# Patient Record
Sex: MALE | Race: WHITE | HISPANIC OR LATINO | Employment: FULL TIME | URBAN - METROPOLITAN AREA
[De-identification: names, ages, dates, MRNs, and addresses within clinical notes are randomized per-mention and may not be internally consistent; named-entity substitution may affect disease eponyms.]

---

## 2017-04-03 ENCOUNTER — ALLSCRIPTS OFFICE VISIT (OUTPATIENT)
Dept: OTHER | Facility: OTHER | Age: 22
End: 2017-04-03

## 2017-04-03 DIAGNOSIS — E55.9 VITAMIN D DEFICIENCY: ICD-10-CM

## 2017-04-03 DIAGNOSIS — K21.9 GASTRO-ESOPHAGEAL REFLUX DISEASE WITHOUT ESOPHAGITIS: ICD-10-CM

## 2017-04-03 DIAGNOSIS — Z00.00 ENCOUNTER FOR GENERAL ADULT MEDICAL EXAMINATION WITHOUT ABNORMAL FINDINGS: ICD-10-CM

## 2017-04-03 DIAGNOSIS — E78.5 HYPERLIPIDEMIA: ICD-10-CM

## 2017-04-04 ENCOUNTER — APPOINTMENT (OUTPATIENT)
Dept: LAB | Facility: CLINIC | Age: 22
End: 2017-04-04
Payer: COMMERCIAL

## 2017-04-04 ENCOUNTER — TRANSCRIBE ORDERS (OUTPATIENT)
Dept: LAB | Facility: CLINIC | Age: 22
End: 2017-04-04

## 2017-04-04 ENCOUNTER — GENERIC CONVERSION - ENCOUNTER (OUTPATIENT)
Dept: OTHER | Facility: OTHER | Age: 22
End: 2017-04-04

## 2017-04-04 DIAGNOSIS — Z00.00 ENCOUNTER FOR GENERAL ADULT MEDICAL EXAMINATION WITHOUT ABNORMAL FINDINGS: ICD-10-CM

## 2017-04-04 DIAGNOSIS — E78.5 HYPERLIPIDEMIA: ICD-10-CM

## 2017-04-04 DIAGNOSIS — K21.9 GASTRO-ESOPHAGEAL REFLUX DISEASE WITHOUT ESOPHAGITIS: ICD-10-CM

## 2017-04-04 DIAGNOSIS — E55.9 VITAMIN D DEFICIENCY: ICD-10-CM

## 2017-04-04 LAB
25(OH)D3 SERPL-MCNC: 10.5 NG/ML (ref 30–100)
ALBUMIN SERPL BCP-MCNC: 4 G/DL (ref 3.5–5)
ALP SERPL-CCNC: 78 U/L (ref 46–116)
ALT SERPL W P-5'-P-CCNC: 38 U/L (ref 12–78)
ANION GAP SERPL CALCULATED.3IONS-SCNC: 6 MMOL/L (ref 4–13)
AST SERPL W P-5'-P-CCNC: 25 U/L (ref 5–45)
BASOPHILS # BLD AUTO: 0.01 THOUSANDS/ΜL (ref 0–0.1)
BASOPHILS NFR BLD AUTO: 0 % (ref 0–1)
BILIRUB SERPL-MCNC: 1.09 MG/DL (ref 0.2–1)
BUN SERPL-MCNC: 16 MG/DL (ref 5–25)
CALCIUM SERPL-MCNC: 9.4 MG/DL (ref 8.3–10.1)
CHLORIDE SERPL-SCNC: 106 MMOL/L (ref 100–108)
CHOLEST SERPL-MCNC: 174 MG/DL (ref 50–200)
CO2 SERPL-SCNC: 28 MMOL/L (ref 21–32)
CREAT SERPL-MCNC: 0.84 MG/DL (ref 0.6–1.3)
EOSINOPHIL # BLD AUTO: 0.1 THOUSAND/ΜL (ref 0–0.61)
EOSINOPHIL NFR BLD AUTO: 2 % (ref 0–6)
ERYTHROCYTE [DISTWIDTH] IN BLOOD BY AUTOMATED COUNT: 11.9 % (ref 11.6–15.1)
GFR SERPL CREATININE-BSD FRML MDRD: >60 ML/MIN/1.73SQ M
GLUCOSE P FAST SERPL-MCNC: 90 MG/DL (ref 65–99)
HCT VFR BLD AUTO: 43.2 % (ref 36.5–49.3)
HDLC SERPL-MCNC: 33 MG/DL (ref 40–60)
HGB BLD-MCNC: 14.5 G/DL (ref 12–17)
LDLC SERPL CALC-MCNC: 121 MG/DL (ref 0–100)
LYMPHOCYTES # BLD AUTO: 2.14 THOUSANDS/ΜL (ref 0.6–4.47)
LYMPHOCYTES NFR BLD AUTO: 35 % (ref 14–44)
MCH RBC QN AUTO: 29.8 PG (ref 26.8–34.3)
MCHC RBC AUTO-ENTMCNC: 33.6 G/DL (ref 31.4–37.4)
MCV RBC AUTO: 89 FL (ref 82–98)
MONOCYTES # BLD AUTO: 0.46 THOUSAND/ΜL (ref 0.17–1.22)
MONOCYTES NFR BLD AUTO: 8 % (ref 4–12)
NEUTROPHILS # BLD AUTO: 3.44 THOUSANDS/ΜL (ref 1.85–7.62)
NEUTS SEG NFR BLD AUTO: 55 % (ref 43–75)
NRBC BLD AUTO-RTO: 0 /100 WBCS
PLATELET # BLD AUTO: 365 THOUSANDS/UL (ref 149–390)
PMV BLD AUTO: 9.3 FL (ref 8.9–12.7)
POTASSIUM SERPL-SCNC: 4 MMOL/L (ref 3.5–5.3)
PROT SERPL-MCNC: 7.9 G/DL (ref 6.4–8.2)
RBC # BLD AUTO: 4.86 MILLION/UL (ref 3.88–5.62)
SODIUM SERPL-SCNC: 140 MMOL/L (ref 136–145)
TRIGL SERPL-MCNC: 99 MG/DL
TSH SERPL DL<=0.05 MIU/L-ACNC: 0.85 UIU/ML (ref 0.36–3.74)
WBC # BLD AUTO: 6.16 THOUSAND/UL (ref 4.31–10.16)

## 2017-04-04 PROCEDURE — 36415 COLL VENOUS BLD VENIPUNCTURE: CPT

## 2017-04-04 PROCEDURE — 85025 COMPLETE CBC W/AUTO DIFF WBC: CPT

## 2017-04-04 PROCEDURE — 82306 VITAMIN D 25 HYDROXY: CPT

## 2017-04-04 PROCEDURE — 84443 ASSAY THYROID STIM HORMONE: CPT

## 2017-04-04 PROCEDURE — 80061 LIPID PANEL: CPT

## 2017-04-04 PROCEDURE — 80053 COMPREHEN METABOLIC PANEL: CPT

## 2017-07-05 DIAGNOSIS — E55.9 VITAMIN D DEFICIENCY: ICD-10-CM

## 2017-11-09 ENCOUNTER — TRANSCRIBE ORDERS (OUTPATIENT)
Dept: LAB | Facility: CLINIC | Age: 22
End: 2017-11-09

## 2017-11-09 ENCOUNTER — ALLSCRIPTS OFFICE VISIT (OUTPATIENT)
Dept: OTHER | Facility: OTHER | Age: 22
End: 2017-11-09

## 2017-11-09 ENCOUNTER — GENERIC CONVERSION - ENCOUNTER (OUTPATIENT)
Dept: OTHER | Facility: OTHER | Age: 22
End: 2017-11-09

## 2017-11-09 ENCOUNTER — APPOINTMENT (OUTPATIENT)
Dept: LAB | Facility: CLINIC | Age: 22
End: 2017-11-09
Payer: COMMERCIAL

## 2017-11-09 DIAGNOSIS — E55.9 VITAMIN D DEFICIENCY: ICD-10-CM

## 2017-11-09 LAB — 25(OH)D3 SERPL-MCNC: 24.1 NG/ML (ref 30–100)

## 2017-11-09 PROCEDURE — 82306 VITAMIN D 25 HYDROXY: CPT

## 2017-11-09 PROCEDURE — 36415 COLL VENOUS BLD VENIPUNCTURE: CPT

## 2017-11-11 NOTE — PROGRESS NOTES
Assessment    1  Frequent headaches (784 0) (R51)    Discussion/Summary    I reviewed with the patient that his headaches appear likely related to his increase in stress (ie  tension headaches)  His symptoms do not sound like migraines  He was encouraged to continue with Tylenol or ibuprofen as needed for headaches  He will also start magnesium daily to try to prevent the headaches  We will hold off on imaging since there are no neurological deficits on exam  He will follow-up in 1 month to reassess his symptoms  Possible side effects of new medications were reviewed with the patient/guardian today  The treatment plan was reviewed with the patient/guardian  The patient/guardian understands and agrees with the treatment plan      Chief Complaint  Followup headaches  History of Present Illness  The patient is being seen for an initial evaluation of headaches  This condition is not related to a specific injury  Symptoms:  denies vision changes, last ophtho visit was about 7-8 months ago, confirms extra stressors recently, but-- no nausea,-- no vomiting,-- no photophobia-- and-- no phonophobia--   The patient presents with complaints of headache (normally at the end of the night but once woke with it - feels it on the sides of head like pressure - wife noticed a bump on the right side of the head a few weeks ago that is not tender )  Associated symptoms:  no paresthesias-- and-- no localized weakness--   The patient presents with complaints of neck pain (and into the upper back )  (takes Tylenol and ibuprofen and finds helpful - resolves within 1/2 hour or so )      Review of Systems   Constitutional: no fever,-- not feeling poorly-- and-- no chills  Eyes: no eyesight problems  Gastrointestinal: no nausea-- and-- no vomiting  Neurological: headache, but-- no numbness,-- no tingling,-- no dizziness,-- no limb weakness,-- no fainting-- and-- no difficulty walking  Active Problems  1   Dyslipidemia (640 4) (E78 5)   2  GERD (gastroesophageal reflux disease) (530 81) (K21 9)   3  Vitamin D deficiency (268 9) (E55 9)    Past Medical History  1  History of Acute tonsillitis (463) (J03 90)   2  Flu vaccine need (V04 81) (Z23)   3  History of acute gastritis (V12 70) (Z87 19)   4  History of diarrhea (V12 79) (Z87 898)   5  History of lymphadenopathy (V13 89) (Z87 898)   6  History of Sore throat (462) (J02 9)    Surgical History  1  History of Oral Surgery Tooth Extraction    Family History  Mother    1  Family history of Anxiety   2  Family history of depression (V17 0) (Z81 8)   3  Family history of hypertension (V17 49) (Z82 49)  Father    4  Family history of    5  Family history of lung cancer (V16 1) (Z80 1)  Brother    6  Family history of Bipolar disorder    Social History   · Full-time employment   · Never a smoker   · No illicit drug use   · Rarely consumes alcohol (V49 89) (Z78 9)    Allergies  1  No Known Drug Allergies    Vitals  Vital Signs    Recorded: 47VKN4572 08:04AM   Heart Rate 92   Systolic 530   Diastolic 80   Height 5 ft 5 6 in   Weight 175 lb 2 oz   BMI Calculated 28 61   BSA Calculated 1 88       Physical Exam   Constitutional  General appearance: No acute distress, well appearing and well nourished  Head and Face  Head and face: Abnormal  -- mild swelling/prominence of the left occipatal region without tenderness or palpable mass noted  Eyes  Conjunctiva and lids: No erythema, swelling or discharge  Pupils and irises: Equal, round, reactive to light  Ears, Nose, Mouth, and Throat  External inspection of ears and nose: Normal    Otoscopic examination: Tympanic membranes translucent with normal light reflex  Canals patent without erythema  Hearing: Normal    Nasal mucosa, septum, and turbinates: Normal without edema or erythema  Lips, teeth, and gums: Normal, good dentition  Oropharynx: Normal with no erythema, edema, exudate or lesions     Neck  Neck: Supple, symmetric, trachea midline, no masses  Thyroid: Normal, no thyromegaly  Pulmonary  Respiratory effort: No increased work of breathing or signs of respiratory distress  Auscultation of lungs: Clear to auscultation  Cardiovascular  Auscultation of heart: Normal rate and rhythm, normal S1 and S2, no murmurs  Peripheral vascular exam: Normal   Radial: right 2+-- and-- left 2+  Lymphatic  Palpation of lymph nodes in neck: No lymphadenopathy  Musculoskeletal  Gait and station: Normal    Inspection/palpation of joints, bones, and muscles: Abnormal  -- no TTP over the neck or shoulders, slight palpable spasm in the trapezius bilaterally  Range of motion: Normal  -- Full range of motion in the neck in all directions without pain elicited  Muscle strength/tone: Normal   Motor Strength Findings: normal upper extremity strength-- and-- normal lower extremity strength  Skin  Skin and subcutaneous tissue: Normal without rashes or lesions  Neurologic  Sensation: No sensory loss  Sensory exam: intact to light touch    Psychiatric  Mood and affect: Normal        Future Appointments    Date/Time Provider Specialty Site   12/11/2017 08:40 AM [facility] [address] [place] [address] MEDICINE       Signatures   Electronically signed by : Flavia Joya; Nov 9 2017 12:32PM EST                       (Author)    Electronically signed by : NERISSA Turner ; Nov 9 2017  7:20PM EST

## 2017-12-11 ENCOUNTER — ALLSCRIPTS OFFICE VISIT (OUTPATIENT)
Dept: OTHER | Facility: OTHER | Age: 22
End: 2017-12-11

## 2017-12-13 NOTE — PROGRESS NOTES
Assessment    1  Frequent headaches (784 0) (R51)    Plan  Flu vaccine need    · Stop: Fluzone Quadrivalent 0 5 ML Intramuscular Suspension    Discussion/Summary    1  Frequent headaches - improved - possibly tension HAs - the patient will continue with magnesium 400 mg daily for headache prevention  He was encouraged to take the Tylenol or ibuprofen if he does get a headache  We discussed that he could follow up with Neurology for further assessment desired, but he will hold off on this for now  He states that if his headaches worsen again down the line he will give us a call back  Possible side effects of new medications were reviewed with the patient/guardian today  The treatment plan was reviewed with the patient/guardian  The patient/guardian understands and agrees with the treatment plan      Chief Complaint  Month Follow up - HeadachesFlu shot      History of Present Illness  The patient presents today for follow-up on [facility]  He is taking the magnesium and notes they are better  The HA are about once a week rather than a few a week  He sometimes takes Tylenol or ibuprofen but does improve within 1/2 hour if does  He usually gets them in the afternoon  He has a lot of stress  He works 2-10 PM  He describes HA as pressure and occ throbbing  He denies photophobia but occ has phonophobia  He denies n/v or vision changes or dizziness  He has not noticed the bump on the back of the head at all since was last here  Review of Systems   Constitutional: no fever-- and-- no chills  Eyes: no eyesight problems  Gastrointestinal: no nausea-- and-- no vomiting  Neurological: no numbness,-- no tingling-- and-- no limb weakness--   The patient presents with complaints of headache  Symptoms are improving  no dizziness      Active Problems  1  Dyslipidemia (272 4) (E78 5)   2  Frequent headaches (784 0) (R51)   3  GERD (gastroesophageal reflux disease) (530 81) (K21 9)   4   Vitamin D deficiency (268 9) (E55 9)    Past Medical History  1  History of Acute tonsillitis (463) (J03 90)   2  Flu vaccine need (V04 81) (Z23)   3  History of acute gastritis (V12 70) (Z87 19)   4  History of diarrhea (V12 79) (Z87 898)   5  History of lymphadenopathy (V13 89) (Z87 898)   6  History of Sore throat (462) (J02 9)    Surgical History  1  History of Oral Surgery Tooth Extraction    Family History  Mother    1  Family history of Anxiety   2  Family history of depression (V17 0) (Z81 8)   3  Family history of hypertension (V17 49) (Z82 49)  Father    4  Family history of    5  Family history of lung cancer (V16 1) (Z80 1)  Brother    6  Family history of Bipolar disorder    Social History     · Full-time employment   · Never a smoker   · No illicit drug use   · Rarely consumes alcohol (V49 89) (Z78 9)    Current Meds   1  Magnesium Oxide 400 MG Oral Capsule; Take 1 tablet daily; Therapy: 31QWT3944 to (Evaluate:95Aes3453)  Requested for: 15EXX5188; Last Rx:2017 Ordered    Allergies  1  No Known Drug Allergies    Vitals  Vital Signs    Recorded: 23Lml5426 09:03AM   Heart Rate 80   Respiration 16   Systolic 366   Diastolic 80   Height 5 ft 5 6 in   Weight 171 lb    BMI Calculated 27 94   BSA Calculated 1 86   O2 Saturation 98, RA       Physical Exam   Constitutional  General appearance: No acute distress, well appearing and well nourished  Head and Face Slight fatty prominent over the left posterior scalp without any tenderness to palpation  Eyes  Conjunctiva and lids: No erythema, swelling or discharge  Pupils and irises: Equal, round, reactive to light  Ears, Nose, Mouth, and Throat  External inspection of ears and nose: Normal    Otoscopic examination: Tympanic membranes translucent with normal light reflex  Canals patent without erythema  Hearing: Normal    Nasal mucosa, septum, and turbinates: Normal without edema or erythema  Lips, teeth, and gums: Normal, good dentition     Oropharynx: Normal with no erythema, edema, exudate or lesions  Neck  Neck: Supple, symmetric, trachea midline, no masses  Pulmonary  Respiratory effort: No increased work of breathing or signs of respiratory distress  Auscultation of lungs: Clear to auscultation  Cardiovascular  Auscultation of heart: Normal rate and rhythm, normal S1 and S2, no murmurs  Peripheral vascular exam: Normal   Radial: right 2+-- and-- left 2+  Lymphatic  Palpation of lymph nodes in neck: No lymphadenopathy  Musculoskeletal  Gait and station: Normal    Muscle strength/tone: Normal   Motor Strength Findings: normal upper extremity strength-- and-- normal lower extremity strength  Neurologic  Sensation: No sensory loss  Sensory exam: intact to light touch    Psychiatric  Mood and affect: Normal        Signatures   Electronically signed by : Dave Wallace [facility]; Dec 11 2017  9:32AM EST                       (Author)    Electronically signed by : NERISSA Phillips ; Dec 12 2017  1:15PM EST

## 2018-01-09 NOTE — RESULT NOTES
Verified Results  (1) VITAMIN D 25-HYDROXY [id number] 08:36AM Himanshu Banuelos   TW Order Number: [id number]     Test Name Result Flag Reference   VIT D 25-HYDROX 24 1 ng/mL L 30 0-100 0   This assay is a certified procedure of the CDC Vitamin D Standardization Certification Program (VDSCP)     Deficiency <20ng/ml   Insufficiency 20-30ng/ml   Sufficient  ng/ml     *Patients undergoing fluorescein dye angiography may retain small amounts of fluorescein in the body for 48-72 hours post procedure  Samples containing fluorescein can produce falsely elevated Vitamin D values  If the patient had this procedure, a specimen should be resubmitted post fluorescein clearance

## 2018-01-10 NOTE — RESULT NOTES
Verified Results  (1) CBC/PLT/DIFF 04Apr2017 12:11PM Emil Sumner Order Number: [id number]     Test Name Result Flag Reference   WBC COUNT 6 16 Thousand/uL  4 31-10 16   RBC COUNT 4 86 Million/uL  3 88-5 62   HEMOGLOBIN 14 5 g/dL  12 0-17 0   HEMATOCRIT 43 2 %  36 5-49 3   MCV 89 fL  82-98   MCH 29 8 pg  26 8-34 3   MCHC 33 6 g/dL  31 4-37 4   RDW 11 9 %  11 6-15 1   MPV 9 3 fL  8 9-12 7   PLATELET COUNT 840 Thousands/uL  149-390   nRBC AUTOMATED 0 /100 WBCs     NEUTROPHILS RELATIVE PERCENT 55 %  43-75   LYMPHOCYTES RELATIVE PERCENT 35 %  14-44   MONOCYTES RELATIVE PERCENT 8 %  4-12   EOSINOPHILS RELATIVE PERCENT 2 %  0-6   BASOPHILS RELATIVE PERCENT 0 %  0-1   NEUTROPHILS ABSOLUTE COUNT 3 44 Thousands/? ??L  1 85-7 62   LYMPHOCYTES ABSOLUTE COUNT 2 14 Thousands/? ??L  0 60-4 47   MONOCYTES ABSOLUTE COUNT 0 46 Thousand/? ??L  0 17-1 22   EOSINOPHILS ABSOLUTE COUNT 0 10 Thousand/? ??L  0 00-0 61   BASOPHILS ABSOLUTE COUNT 0 01 Thousands/? ??L  0 00-0 10   - Patient Instructions: This bloodwork is non-fasting  Please drink two glasses of water morning of bloodwork  - Patient Instructions: This bloodwork is non-fasting  Please drink two glasses of water morning of bloodwork  (1) COMPREHENSIVE METABOLIC PANEL [id number] 35:08PE Lion Cronin   TW Order Number: [id number]     Test Name Result Flag Reference   SODIUM 140 mmol/L  136-145   POTASSIUM 4 0 mmol/L  3 5-5 3   CHLORIDE 106 mmol/L  100-108   CARBON DIOXIDE 28 mmol/L  21-32   ANION GAP (CALC) 6 mmol/L  4-13   BLOOD UREA NITROGEN 16 mg/dL  5-25   CREATININE 0 84 mg/dL  0 60-1 30   Standardized to IDMS reference method   CALCIUM 9 4 mg/dL  8 3-10 1   BILI, TOTAL 1 09 mg/dL H 0 20-1 00   ALK PHOSPHATAS 78 U/L     ALT (SGPT) 38 U/L  12-78   AST(SGOT) 25 U/L  5-45   ALBUMIN 4 0 g/dL  3 5-5 0   TOTAL PROTEIN 7 9 g/dL  6 4-8 2   eGFR Non-African American      >60 0 ml/min/1 73sq m   - Patient Instructions:  This is a fasting blood test  Water, black tea or black coffee only after 9:00pm the night before test Drink 2 glasses of water the morning of test   National Kidney Disease Education Program recommendations are as follows:  GFR calculation is accurate only with a steady state creatinine  Chronic Kidney disease less than 60 ml/min/1 73 sq  meters  Kidney failure less than 15 ml/min/1 73 sq  meters  GLUCOSE FASTING 90 mg/dL  65-99     (1) LIPID PANEL FASTING W DIRECT LDL REFLEX 04Apr2017 12:11PM Wyrigo Martineztam    Order Number: [id number]     Test Name Result Flag Reference   CHOLESTEROL 174 mg/dL     LDL CHOLESTEROL CALCULATED 121 mg/dL H 0-100   - Patient Instructions: This is a fasting blood test  Water, black tea or black coffee only after 9:00pm the night before test   Drink 2 glasses of water the morning of test     - Patient Instructions: This is a fasting blood test  Water, black tea or black coffee only after 9:00pm the night before test Drink 2 glasses of water the morning of test   Triglyceride:         Normal              <150 mg/dl       Borderline High    150-199 mg/dl       High               200-499 mg/dl       Very High          >499 mg/dl  Cholesterol:         Desirable        <200 mg/dl      Borderline High  200-239 mg/dl      High             >239 mg/dl  HDL Cholesterol:        High    >59 mg/dL      Low     <41 mg/dL  LDL Cholesterol:        Optimal          <100 mg/dl        Near Optimal     100-129 mg/dl        Above Optimal          Borderline High   130-159 mg/dl          High              160-189 mg/dl          Very High        >189 mg/dl  LDL CALCULATED:    This screening LDL is a calculated result  It does not have the accuracy of the Direct Measured LDL in the monitoring of patients with hyperlipidemia and/or statin therapy  Direct Measure LDL (CFN785) must be ordered separately in these patients     TRIGLYCERIDES 99 mg/dL  <=150   Specimen collection should occur prior to N-Acetylcysteine or Metamizole administration due to the potential for falsely depressed results  HDL,DIRECT 33 mg/dL L 40-60   Specimen collection should occur prior to Metamizole administration due to the potential for falsely depressed results  (1) TSH WITH FT4 REFLEX 04Apr2017 12:11PM Hollie Carney Order Number: [id number]     Test Name Result Flag Reference   TSH 0 855 uIU/mL  0 358-3 740   - Patient Instructions: This is a fasting blood test  Water, black tea or black coffee only after 9:00pm the night before test Drink 2 glasses of water the morning of test   Patients undergoing fluorescein dye angiography may retain small amounts of fluorescein in the body for 48-72 hours post procedure  Samples containing fluorescein can produce falsely depressed TSH values  If the patient had this procedure,a specimen should be resubmitted post fluorescein clearance  (1) VITAMIN D 25-HYDROXY 04Apr2017 12:11PM Hollie Carney Order Number: [id number]     Test Name Result Flag Reference   VIT D 25-HYDROX 10 5 ng/mL L 30 0-100 0   This assay is a certified procedure of the CDC Vitamin D Standardization Certification Program (VDSCP)     Deficiency <20ng/ml   Insufficiency 20-30ng/ml   Sufficient  ng/ml     *Patients undergoing fluorescein dye angiography may retain small amounts of fluorescein in the body for 48-72 hours post procedure  Samples containing fluorescein can produce falsely elevated Vitamin D values  If the patient had this procedure, a specimen should be resubmitted post fluorescein clearance

## 2018-01-10 NOTE — PROGRESS NOTES
Assessment    1  Encounter for preventive health examination (V70 0) (Z00 00)   2  GERD (gastroesophageal reflux disease) (530 81) (K21 9)   3  Vitamin D deficiency (268 9) (E55 9)   4  Dyslipidemia (272 4) (E78 5)   5  Need for Tdap vaccination (V06 1) (Z23)   6  Never a smoker    Plan  Dyslipidemia    · (1) TSH WITH FT4 REFLEX; Status:Active; Requested for:03Apr2017;   Dyslipidemia, Health Maintenance, GERD (gastroesophageal reflux disease), Vitamin D  deficiency    · (1) LIPID PANEL FASTING W DIRECT LDL REFLEX; Status:Active; Requested  for:03Apr2017;   Dyslipidemia, Vitamin D deficiency    · (1) VITAMIN D 25-HYDROXY; Status:Active; Requested for:03Apr2017;   Flu vaccine need    · Stop: Fluzone Quadrivalent Intramuscular Suspension  Health Maintenance, GERD (gastroesophageal reflux disease), Vitamin D deficiency    · (1) CBC/PLT/DIFF; Status:Active; Requested for:03Apr2017;    · (1) COMPREHENSIVE METABOLIC PANEL; Status:Active; Requested for:03Apr2017;   Need for Tdap vaccination    · Adacel 5-2-15 5 LF-MCG/0 5 Intramuscular Suspension    Discussion/Summary  Impression: health maintenance visit, healthy adult male  Currently, he eats a poor diet, has an adequate exercise regimen and The patient was encouraged to continue with his recently restarted exercise regimen  He is also encouraged to work on improving his diet to help him lose some of the 30 pounds that he gained over the last 2 years  Prostate cancer screening: PSA is not indicated  Testicular cancer screening: the risks and benefits of testicular cancer screening were discussed and monthly self testicular exam was advised  Colorectal cancer screening: colorectal cancer screening is not indicated  Screening lab work includes Labs ordered as above  The immunizations will be given as outlined in the orders  Advice and education were given regarding nutrition, aerobic exercise, weight loss and sunscreen use  Patient discussion: discussed with the patient  Vitamin D deficiency - the patient is no longer taking his vitamin D supplement  We'll recheck his level as above  GERD - the patient was encouraged to decrease possible triggering foods  If his symptoms do not improve, he could try taking over-the-counter Zantac or Pepcid  Dyslipidemia - recheck with labs as above  The treatment plan was reviewed with the patient/guardian  The patient/guardian understands and agrees with the treatment plan      Chief Complaint  Initial Visit/ physical       History of Present Illness  HM, Adult Male: The patient is being seen for a health maintenance evaluation  The last health maintenance visit was 2 year(s) ago  General Health: The patient's health since the last visit is described as good  He has regular dental visits  He complains of vision problems  Vision care includes wearing glasses, wearing soft contact lenses and having eye examinations 1 times per year  He denies hearing loss  Immunizations status: not up to date The patient needs the following immunization(s): tetanus vaccine  Lifestyle:  He does not have a healthy diet  (notes that he had breakfast (omelet) yesterday and then lasagna for dinner - had cookies for snack and drinks water )   He exercises regularly (started last week)  He exercises 4 times per week for 60-90 minutes per session  Exercise includes cardio  He does not use tobacco  He denies alcohol use  He denies drug use  Reproductive health:  the patient is sexually active  birth control is not being practiced  He denies erectile dysfunction  Screening: Prostate cancer screening includes no previous digital rectal examination  Testicular cancer screening includes no self testicular examinations  Colorectal cancer screening includes no previous colonoscopy  Metabolic screening includes lipid profile performed 2015, glucose screening performed 2015 and thyroid function test performed 2015       Safety elements used: seat belt, smoke detector and carbon monoxide detector, but no sunscreen  Risk findings: no domestic violence  Review of Systems    Constitutional: no fever and no chills  Cardiovascular: no chest pain and no palpitations  Respiratory: no shortness of breath, no cough and no wheezing  Gastrointestinal: has heartburn - occasionally take Tums - notes that he gets it once weekly, but no nausea, no vomiting, no diarrhea and no blood in stools  Genitourinary: no dysuria  Musculoskeletal: back pain occasionally  The patient presents with complaints of skin lesion (warts on left hand - has had for years)  Neurological: no dizziness and no fainting    The patient presents with complaints of headache (happens about 1-2 times - resolves with Tylenol - has mild photophobia but no phonophobia, n/v - began a few months ago - saw ophtho a few weeks ago but no big change )  Psychiatric: no anxiety and no depression  Hematologic/Lymphatic: no tendency for easy bleeding and no tendency for easy bruising  Active Problems    1  GERD (gastroesophageal reflux disease) (530 81) (K21 9)   2  Vitamin D deficiency (268 9) (E55 9)    Past Medical History    · History of Acute tonsillitis (463) (J03 90)   · History of acute gastritis (V12 70) (Z87 19)   · History of diarrhea (V12 79) (O23 841)   · History of lymphadenopathy (V13 89) (Z87 898)   · History of Sore throat (462) (J02 9)    Surgical History    · History of Oral Surgery Tooth Extraction    Family History  Mother    · Family history of Anxiety   · Family history of depression (V17 0) (Z81 8)   · Family history of hypertension (V17 49) (Z82 49)  Father    · Family history of    · Family history of lung cancer (V16 1) (Z80 1)  Brother    · Family history of Bipolar disorder    Social History    · Full-time employment   · Never a smoker   · No illicit drug use   · Rarely consumes alcohol (V49 89) (Z78 9)    Allergies    1   No Known Drug Allergies    Vitals Recorded: 03Apr2017 10:34AM   Temperature 98 1 F   Heart Rate 92   Systolic 578   Diastolic 76   Height 5 ft 5 6 in   Weight 182 lb 6 oz   BMI Calculated 29 8   BSA Calculated 1 91     Physical Exam    Constitutional   General appearance: No acute distress, well appearing and well nourished  overweight  Head and Face   Head and face: Normal     Eyes   Conjunctiva and lids: No erythema, swelling or discharge  Pupils and irises: Equal, round, reactive to light  Ears, Nose, Mouth, and Throat   External inspection of ears and nose: Normal     Otoscopic examination: Tympanic membranes translucent with normal light reflex  Canals patent without erythema  Hearing: Normal     Nasal mucosa, septum, and turbinates: Normal without edema or erythema  Lips, teeth, and gums: Normal, good dentition  Oropharynx: Normal with no erythema, edema, exudate or lesions  Neck   Neck: Supple, symmetric, trachea midline, no masses  Thyroid: Normal, no thyromegaly  Pulmonary   Respiratory effort: No increased work of breathing or signs of respiratory distress  Auscultation of lungs: Clear to auscultation  Cardiovascular   Auscultation of heart: Normal rate and rhythm, normal S1 and S2, no murmurs  Peripheral vascular exam: Normal   Radial: right 2+ and left 2+  Abdomen   Abdomen: Non-tender, no masses  Liver and spleen: No hepatomegaly or splenomegaly  Lymphatic   Palpation of lymph nodes in neck: No lymphadenopathy  Musculoskeletal   Gait and station: Normal     Muscle strength/tone: Normal   Motor Strength Findings: normal upper extremity strength and normal lower extremity strength  Skin   Examination of the skin for lesions: Abnormal   scattered verruca on the left hand and one on the right thumb (all on palmar aspect of hands)  Neurologic   Sensation: No sensory loss  Sensory exam: intact to light touch     Psychiatric   Mood and affect: Normal        Results/Data  PHQ-2 Adult Depression Screening [id number] 10:37AM User, s     Test Name Result Flag Reference   PHQ-2 Adult Depression Screening Negative     PHQ-2 Adult Depression Score 0     Over the last two weeks, how often have you been bothered by any of the following problems? Little interest or pleasure in doing things: Not at all - 0  Feeling down, depressed, or hopeless: Not at all - 0       Signatures   Electronically signed by : Flavia Mayers;  Apr  3 2017  7:15PM EST                       (Author)    Electronically signed by : NERISSA Velasco ; Apr  3 2017 11:11PM EST

## 2018-01-13 VITALS
WEIGHT: 175.13 LBS | HEART RATE: 92 BPM | SYSTOLIC BLOOD PRESSURE: 118 MMHG | DIASTOLIC BLOOD PRESSURE: 80 MMHG | BODY MASS INDEX: 28.15 KG/M2 | HEIGHT: 66 IN

## 2018-01-13 VITALS
DIASTOLIC BLOOD PRESSURE: 76 MMHG | WEIGHT: 182.38 LBS | HEIGHT: 66 IN | HEART RATE: 92 BPM | BODY MASS INDEX: 29.31 KG/M2 | SYSTOLIC BLOOD PRESSURE: 114 MMHG | TEMPERATURE: 98.1 F

## 2018-01-23 VITALS
SYSTOLIC BLOOD PRESSURE: 110 MMHG | HEIGHT: 66 IN | BODY MASS INDEX: 27.48 KG/M2 | WEIGHT: 171 LBS | DIASTOLIC BLOOD PRESSURE: 80 MMHG | RESPIRATION RATE: 16 BRPM | OXYGEN SATURATION: 98 % | HEART RATE: 80 BPM

## 2018-01-26 ENCOUNTER — OFFICE VISIT (OUTPATIENT)
Dept: FAMILY MEDICINE CLINIC | Facility: CLINIC | Age: 23
End: 2018-01-26
Payer: COMMERCIAL

## 2018-01-26 VITALS
OXYGEN SATURATION: 98 % | DIASTOLIC BLOOD PRESSURE: 76 MMHG | TEMPERATURE: 102.4 F | WEIGHT: 174.8 LBS | SYSTOLIC BLOOD PRESSURE: 124 MMHG | HEART RATE: 116 BPM | BODY MASS INDEX: 28.56 KG/M2

## 2018-01-26 DIAGNOSIS — R50.9 FEVER, UNSPECIFIED FEVER CAUSE: ICD-10-CM

## 2018-01-26 DIAGNOSIS — J02.9 SORE THROAT: Primary | ICD-10-CM

## 2018-01-26 PROBLEM — E78.5 DYSLIPIDEMIA: Status: ACTIVE | Noted: 2017-04-03

## 2018-01-26 PROBLEM — R51.9 FREQUENT HEADACHES: Status: ACTIVE | Noted: 2017-11-09

## 2018-01-26 LAB — S PYO AG THROAT QL: NEGATIVE

## 2018-01-26 PROCEDURE — 87880 STREP A ASSAY W/OPTIC: CPT | Performed by: PHYSICIAN ASSISTANT

## 2018-01-26 PROCEDURE — 99213 OFFICE O/P EST LOW 20 MIN: CPT | Performed by: PHYSICIAN ASSISTANT

## 2018-01-26 RX ORDER — OSELTAMIVIR PHOSPHATE 75 MG/1
75 CAPSULE ORAL EVERY 12 HOURS SCHEDULED
Qty: 10 CAPSULE | Refills: 0 | Status: SHIPPED | OUTPATIENT
Start: 2018-01-26 | End: 2018-01-31

## 2018-01-26 NOTE — LETTER
January 26, 2018     Patient: Lisa Pyle   YOB: 1995   Date of Visit: 1/26/2018       To Whom it May Concern:    Evan Rodríguez is under my professional care  He was seen in my office on 1/26/2018  He may return to work on Wednesday, January 31, 2018  If you have any questions or concerns, please don't hesitate to call           Sincerely,          Art Morocho PA-C        CC: No Recipients

## 2018-01-26 NOTE — PROGRESS NOTES
Assessment/Plan:    No problem-specific Assessment & Plan notes found for this encounter  Diagnoses and all orders for this visit:    Sore throat  -     POCT rapid strepA    Fever, unspecified fever cause  -     Influenza A/B and RSV by PCR; Future  -     Influenza A/B and RSV by PCR  -     oseltamivir (TAMIFLU) 75 mg capsule; Take 1 capsule (75 mg total) by mouth every 12 (twelve) hours for 5 days        We reviewed that the rapid strep test was negative  It appears the symptoms are more consistent with influenza  A swab done and sent out to confirm the diagnosis of influenza  He was given a script to start on Tamiflu twice daily for the next 5 days  He should get increase fluids and as much rest as possible  He can continue to use over-the-counter cold medications such as the Enedelia-[place] Plus as needed for symptom relief  He should call his symptoms worsen or fail to improve  Subjective:      Patient ID: Arabella Bruno is a 23 y o  male  The patient presents today for sore throat and fever  He reports that it began 2 days ago  He notes that he stayed home yesterday and took vitamin C and Enedelia [place] Plus cold med  He woke up still feeling body sore  He notes that co-workers have been sick but no one at home  He did not get a flu shot this year  The following portions of the patient's history were reviewed and updated as appropriate: allergies, current medications and problem list     Review of Systems   Constitutional: Positive for chills and fever  HENT: Positive for congestion (slight), ear pain (when coughs) and sore throat (improved since yesterday)  Negative for postnasal drip and rhinorrhea  Eyes: Negative for pain and discharge  Respiratory: Positive for cough (usually dry) and shortness of breath (when coughing)  Negative for chest tightness and wheezing  Cardiovascular: Negative for chest pain and palpitations     Gastrointestinal: Negative for abdominal pain, diarrhea, nausea and vomiting  Musculoskeletal: Positive for myalgias  Skin: Negative for rash  Neurological: Positive for headaches (in the top of the head - better with Enedelia-[place] Plus every 4 hours)  Negative for dizziness  Objective:     Physical Exam   Constitutional: He appears well-developed and well-nourished  HENT:   Head: Normocephalic and atraumatic  Right Ear: Tympanic membrane, external ear and ear canal normal    Left Ear: Tympanic membrane, external ear and ear canal normal    Nose: Mucosal edema present  Right sinus exhibits no maxillary sinus tenderness and no frontal sinus tenderness  Left sinus exhibits no maxillary sinus tenderness and no frontal sinus tenderness  Mouth/Throat: Mucous membranes are normal  Posterior oropharyngeal erythema present  No oropharyngeal exudate or posterior oropharyngeal edema  Eyes: Conjunctivae and lids are normal  Pupils are equal, round, and reactive to light  Neck: Trachea normal and normal range of motion  Neck supple  No thyromegaly present  Cardiovascular: Normal rate, regular rhythm and normal heart sounds  No murmur heard  Pulses:       Radial pulses are 2+ on the right side, and 2+ on the left side  Pulmonary/Chest: Effort normal and breath sounds normal  He has no decreased breath sounds  He has no wheezes  He has no rhonchi  He has no rales  Lymphadenopathy:     He has cervical adenopathy  Neurological: He is alert  Skin: Skin is warm, dry and intact  No rash noted  Psychiatric: He has a normal mood and affect

## 2018-01-26 NOTE — PATIENT INSTRUCTIONS
The rapid strep test was negative  It appears the symptoms are more consistent with influenza  A swab done and sent out to confirm the diagnosis of influenza  He was given a script to start on Tamiflu twice daily for the next 5 days  He should get increase fluids and as much rest as possible  He can continue to use over-the-counter cold medications such as the Enedelia-[place] Plus as needed for symptom relief  He should call his symptoms worsen or fail to improve

## 2018-01-27 LAB
FLUAV AG SPEC QL: DETECTED
FLUBV AG SPEC QL: ABNORMAL
RSV B RNA SPEC QL NAA+PROBE: ABNORMAL

## 2018-01-27 PROCEDURE — 87798 DETECT AGENT NOS DNA AMP: CPT | Performed by: PHYSICIAN ASSISTANT

## 2018-01-29 ENCOUNTER — TELEPHONE (OUTPATIENT)
Dept: FAMILY MEDICINE CLINIC | Facility: CLINIC | Age: 23
End: 2018-01-29

## 2018-01-29 NOTE — TELEPHONE ENCOUNTER
----- Message from Lyn Lincoln PA-C sent at 1/29/2018  8:11 AM EST -----  Please call the patient regarding his abnormal result  Please let him know that his flu swab did come back positive for influenza A  Please encourage him to finish his course of Tamiflu  He should follow up if symptoms worsen or fail to resolve

## 2018-01-30 ENCOUNTER — TELEPHONE (OUTPATIENT)
Dept: FAMILY MEDICINE CLINIC | Facility: CLINIC | Age: 23
End: 2018-01-30

## 2018-01-30 NOTE — TELEPHONE ENCOUNTER
----- Message from Bert Mcguire PA-C sent at 1/29/2018  8:11 AM EST -----  Please call the patient regarding his abnormal result  Please let him know that his flu swab did come back positive for influenza A  Please encourage him to finish his course of Tamiflu  He should follow up if symptoms worsen or fail to resolve

## 2018-05-22 ENCOUNTER — OFFICE VISIT (OUTPATIENT)
Dept: FAMILY MEDICINE CLINIC | Facility: CLINIC | Age: 23
End: 2018-05-22
Payer: COMMERCIAL

## 2018-05-22 VITALS
BODY MASS INDEX: 26.74 KG/M2 | OXYGEN SATURATION: 98 % | WEIGHT: 166.38 LBS | SYSTOLIC BLOOD PRESSURE: 102 MMHG | TEMPERATURE: 97 F | DIASTOLIC BLOOD PRESSURE: 78 MMHG | HEIGHT: 66 IN | HEART RATE: 76 BPM

## 2018-05-22 DIAGNOSIS — E78.5 DYSLIPIDEMIA: ICD-10-CM

## 2018-05-22 DIAGNOSIS — K21.9 GASTROESOPHAGEAL REFLUX DISEASE, ESOPHAGITIS PRESENCE NOT SPECIFIED: ICD-10-CM

## 2018-05-22 DIAGNOSIS — Z00.00 WELL ADULT EXAM: Primary | ICD-10-CM

## 2018-05-22 DIAGNOSIS — L72.9 SCROTAL CYST: ICD-10-CM

## 2018-05-22 DIAGNOSIS — E55.9 VITAMIN D DEFICIENCY: ICD-10-CM

## 2018-05-22 PROBLEM — J02.9 SORE THROAT: Status: RESOLVED | Noted: 2018-01-26 | Resolved: 2018-05-22

## 2018-05-22 PROBLEM — R50.9 FEVER: Status: RESOLVED | Noted: 2018-01-26 | Resolved: 2018-05-22

## 2018-05-22 PROCEDURE — 99395 PREV VISIT EST AGE 18-39: CPT | Performed by: FAMILY MEDICINE

## 2018-05-22 NOTE — ASSESSMENT & PLAN NOTE
Has not been taking his vitamin-D supplement for quite some time  We will reassess with labs as ordered today

## 2018-05-22 NOTE — PROGRESS NOTES
[facility]  FAMILY PRACTICE OFFICE VISIT       NAME: Rubens Crenshaw  AGE: 20 y o  SEX: male       : 1995        MRN: [id number]    DATE: 2018  TIME: 1:09 PM    Assessment and Plan     Problem List Items Addressed This Visit     Dyslipidemia       Reassess with labs as ordered today  Relevant Orders    Comprehensive metabolic panel    Lipid Panel with Direct LDL reflex    GERD (gastroesophageal reflux disease)       Recently flared over the last few months  He was given a sample of Nexium to try for the next 2 weeks  Was encouraged to call if he felt that his symptoms were not improving with the Nexium or if it recurred after discontinuing the medication  He was encouraged to avoid triggering foods such as tomato products, spicy foods, citrus products, chocolate, alcohol  Relevant Medications    esomeprazole (NexIUM) 20 mg capsule    Vitamin D deficiency       Has not been taking his vitamin-D supplement for quite some time  We will reassess with labs as ordered today  Relevant Orders    Vitamin D 25 hydroxy      Other Visit Diagnoses     Well adult exam    -  Primary    Relevant Orders    Comprehensive metabolic panel    Lipid Panel with Direct LDL reflex    Vitamin D 25 hydroxy    Scrotal cyst          Reassured patient that this was just a cyst of the skin in the scrotal area  He was encouraged to monitor and call if it worsened or became bothersome  GERD (gastroesophageal reflux disease)    Recently flared over the last few months  He was given a sample of Nexium to try for the next 2 weeks  Was encouraged to call if he felt that his symptoms were not improving with the Nexium or if it recurred after discontinuing the medication  He was encouraged to avoid triggering foods such as tomato products, spicy foods, citrus products, chocolate, alcohol  Dyslipidemia    Reassess with labs as ordered today      Vitamin D deficiency    Has not been taking his vitamin-D supplement for quite some time  We will reassess with labs as ordered today  The patient presented today for health maintenance visit  He currently eats a balanced diet  He has a regular exercise regimen  He was encouraged to continue positive lifestyle choices  For testicular cancer screening, monthly self-testicular exam was recommended  Screening labs were ordered  Immunizations are up-to-date  Chief Complaint     Chief Complaint   Patient presents with    Physical Exam    Heartburn       History of Present Illness   Olivier Whitman is a 20y o -year-old male who presents for physical exam      , Adult Male:  The patient is being seen for health maintenance evaluation  General Health: The patient's health since the last visit is described as good  He has had regular dental visits  He has vision problems - requires glasses and goes yearly in June  He denies hearing loss  Immunizations are up-to-date  Lifestyle:  He describes his diet as well-balanced - 3 meals a daily and coffee in the morning - then water the rest of the day  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  He does not use tobacco   He denies alcohol use  He denies drug use  Reproductive Health:  He confirms regular self-testicular exam - has a lump in the left scrotal area - no pain, discharge, swelling, redness  Screening:  His colorectal cancer screening was never  Metabolic screening includes lipid panel done 4/2017, glucose screening done 4/2017, thyroid function test done 4/2017  Safety Screening:  He reports confirms wearing seatbelts, having smoke and carbon monoxide detectors, and wearing sunscreen  He denies domestic violence  He notes that he has been having trouble with acid reflux over the last few months  He has it most days of the week - tries to avoid spicy foods and citrus   He has it sometimes most of the day and other days only part of the day  He has tried Tums which helps sometimes  He has a history of low Vit D but not taking supplements for a while          Review of Systems   Review of Systems   Constitutional: Negative for chills and fever  HENT: Negative for congestion, rhinorrhea and sore throat  Eyes: Negative for visual disturbance  Respiratory: Negative for cough, shortness of breath and wheezing  Cardiovascular: Negative for chest pain, palpitations and leg swelling  Gastrointestinal: Negative for abdominal pain, blood in stool, constipation, diarrhea, nausea and vomiting  Heartburn    Endocrine: Negative for polydipsia and polyuria  Genitourinary: Negative for dysuria  Musculoskeletal: Positive for back pain (improves with stretching - links to work)  Negative for arthralgias and myalgias  Skin: Negative for rash  Neurological: Negative for dizziness, syncope and headaches  Hematological: Does not bruise/bleed easily  Psychiatric/Behavioral: Negative for dysphoric mood  The patient is not nervous/anxious  Active Problem List     Patient Active Problem List   Diagnosis    Dyslipidemia    Frequent headaches    GERD (gastroesophageal reflux disease)    Vitamin D deficiency         Past Medical History:  No past medical history on file      Past Surgical History:  Past Surgical History:   Procedure Laterality Date    WISDOM TOOTH EXTRACTION      and 4 additional teeth that were extra       Family History:  Family History   Problem Relation Age of Onset    Hypertension Mother     Mental illness Mother      panic attacks    Asthma Father     Lung cancer Father     No Known Problems Sister     Mental illness Brother      bipolar disorder    No Known Problems Son     No Known Problems Daughter     Diabetes Maternal Grandmother     Diabetes Maternal Grandfather      possible    Diabetes Maternal Uncle     No Known Problems Sister        Social History:  Social History     Social History    Marital status: /Civil Union     Spouse name: N/A    Number of children: N/A    Years of education: N/A     Occupational History    Not on file  Social History Main Topics    Smoking status: Never Smoker    Smokeless tobacco: Never Used    Alcohol use Yes      Comment: ocassional     Drug use: No    Sexual activity: Not on file     Other Topics Concern    Not on file     Social History Narrative    No narrative on file       Objective     Vitals:    05/22/18 1013   BP: 102/78   Pulse: 76   Temp: (!) 97 °F (36 1 °C)   SpO2: 98%     Wt Readings from Last 3 Encounters:   05/22/18 75 5 kg (166 lb 6 oz)   01/26/18 79 3 kg (174 lb 12 8 oz)   12/11/17 77 6 kg (171 lb)       Physical Exam   Constitutional: He appears well-developed and well-nourished  No distress  HENT:   Head: Normocephalic and atraumatic  Right Ear: Hearing, tympanic membrane, external ear and ear canal normal    Left Ear: Hearing, tympanic membrane, external ear and ear canal normal    Nose: Nose normal    Mouth/Throat: Uvula is midline, oropharynx is clear and moist and mucous membranes are normal    Eyes: Conjunctivae and lids are normal  Pupils are equal, round, and reactive to light  Neck: Trachea normal and normal range of motion  Neck supple  Carotid bruit is not present  No thyroid mass and no thyromegaly present  Cardiovascular: Normal rate, regular rhythm, S1 normal, S2 normal, normal heart sounds, intact distal pulses and normal pulses  No murmur heard  Pulses:       Radial pulses are 2+ on the right side, and 2+ on the left side  Posterior tibial pulses are 2+ on the right side, and 2+ on the left side  Pulmonary/Chest: Effort normal and breath sounds normal  He has no decreased breath sounds  He has no wheezes  He has no rhonchi  He has no rales  Abdominal: Soft  Normal appearance and bowel sounds are normal  There is no splenomegaly or hepatomegaly  There is no tenderness  No hernia  Genitourinary: Testes normal        Right testis shows no mass, no swelling and no tenderness  Left testis shows no mass, no swelling and no tenderness  Lymphadenopathy:     He has no cervical adenopathy  Neurological: He is alert  He has normal strength  No sensory deficit  Skin: Skin is warm, dry and intact  No rash noted  Psychiatric: He has a normal mood and affect  His speech is normal and behavior is normal    Vitals reviewed  Pertinent Laboratory/Diagnostic Studies:  Lab Results   Component Value Date    GLUCOSE 87 05/08/2015    BUN 16 04/04/2017    CREATININE 0 84 04/04/2017    CALCIUM 9 4 04/04/2017     04/04/2017    K 4 0 04/04/2017    CO2 28 04/04/2017     04/04/2017     Lab Results   Component Value Date    ALT 38 04/04/2017    AST 25 04/04/2017    ALKPHOS 78 04/04/2017    BILITOT 1 09 (H) 04/04/2017       Lab Results   Component Value Date    WBC 6 16 04/04/2017    HGB 14 5 04/04/2017    HCT 43 2 04/04/2017    MCV 89 04/04/2017     04/04/2017     Lab Results   Component Value Date    CHOL 174 04/04/2017     Lab Results   Component Value Date    TRIG 99 04/04/2017     Lab Results   Component Value Date    HDL 33 (L) 04/04/2017     Lab Results   Component Value Date    LDLCALC 121 (H) 04/04/2017     Lab Results   Component Value Date    HGBA1C 4 0 05/08/2015           Orders Placed This Encounter   Procedures    Comprehensive metabolic panel    Lipid Panel with Direct LDL reflex    Vitamin D 25 hydroxy       ALLERGIES:  No Known Allergies    Current Medications     Current Outpatient Prescriptions   Medication Sig Dispense Refill    esomeprazole (NexIUM) 20 mg capsule Take 1 capsule (20 mg total) by mouth daily in the early morning 14 capsule 0     No current facility-administered medications for this visit            Health Maintenance     Health Maintenance   Topic Date Due    HIV SCREENING  1995    INFLUENZA VACCINE 09/01/2018    Depression Screening PHQ-9  05/22/2019    DTaP,Tdap,and Td Vaccines (7 - Td) 04/03/2027     Immunization History   Administered Date(s) Administered    DTP 1995, 1995, 1995, 05/30/1997    HPV Quadrivalent 03/16/2011    Hep A, ped/adol, 2 dose 10/15/2007, 12/08/2008    Hep B, Adolescent or Pediatric 05/30/1994, 05/30/1997, 07/09/1997, 03/24/1998    Hib (PRP-T) 1995, 1993, 1993, 07/08/1994    IPV 1995, 1993, 05/30/1997    Influenza 01/16/2006, 12/08/2008, 12/28/2009, 03/16/2011    MMR 07/08/1994, 05/30/1997    Meningococcal MCV4P 01/16/2006, 03/16/2011    Td (adult), Unspecified 01/16/2006    Tdap 12/28/2009, 04/03/2017    Tuberculin Skin Test-PPD Intradermal 10/15/2007    Varicella 08/11/1998, 12/08/2008       Puja Barnhart PA-C  5/22/2018 1:09 PM  [facility]

## 2018-05-22 NOTE — ASSESSMENT & PLAN NOTE
Recently flared over the last few months  He was given a sample of Nexium to try for the next 2 weeks  Was encouraged to call if he felt that his symptoms were not improving with the Nexium or if it recurred after discontinuing the medication  He was encouraged to avoid triggering foods such as tomato products, spicy foods, citrus products, chocolate, alcohol

## 2019-12-17 ENCOUNTER — TELEPHONE (OUTPATIENT)
Dept: FAMILY MEDICINE CLINIC | Facility: CLINIC | Age: 24
End: 2019-12-17

## 2019-12-18 ENCOUNTER — TELEPHONE (OUTPATIENT)
Dept: FAMILY MEDICINE CLINIC | Facility: CLINIC | Age: 24
End: 2019-12-18

## 2019-12-18 NOTE — TELEPHONE ENCOUNTER
CALLED PT TRIED TO SET UP A ANNUAL PHYSICAL  PT SAID HE JUST HAD A CD[facility] PHYSICAL FOR WORK  AND WILL CALL US IN THE NEW YEAR WHEN HE HAS A BETTER IDEA OF WHEN HE IS AVAILABLE

## 2019-12-18 NOTE — TELEPHONE ENCOUNTER
CALLED PT HE SAID HE JUST HAD A [facility] PHYSICAL FOR HIS JOB AND WILL CALL US BACK IN THE NEW YEAR TO SET UP A PHYSICAL APPOINTMENT

## 2021-11-17 ENCOUNTER — OFFICE VISIT (OUTPATIENT)
Dept: FAMILY MEDICINE CLINIC | Facility: CLINIC | Age: 26
End: 2021-11-17
Payer: COMMERCIAL

## 2021-11-17 VITALS
HEART RATE: 77 BPM | SYSTOLIC BLOOD PRESSURE: 116 MMHG | RESPIRATION RATE: 16 BRPM | DIASTOLIC BLOOD PRESSURE: 68 MMHG | WEIGHT: 161.2 LBS | HEIGHT: 65 IN | BODY MASS INDEX: 26.86 KG/M2 | TEMPERATURE: 98.5 F

## 2021-11-17 DIAGNOSIS — Z00.00 ANNUAL PHYSICAL EXAM: Primary | ICD-10-CM

## 2021-11-17 DIAGNOSIS — Z91.89 ENCOUNTER FOR HEPATITIS C VIRUS SCREENING TEST FOR HIGH RISK PATIENT: ICD-10-CM

## 2021-11-17 DIAGNOSIS — Z11.59 ENCOUNTER FOR HEPATITIS C VIRUS SCREENING TEST FOR HIGH RISK PATIENT: ICD-10-CM

## 2021-11-17 DIAGNOSIS — E55.9 VITAMIN D DEFICIENCY: ICD-10-CM

## 2021-11-17 DIAGNOSIS — L24.4 IRRITANT CONTACT DERMATITIS DUE TO DRUG IN CONTACT WITH SKIN: ICD-10-CM

## 2021-11-17 PROCEDURE — 1036F TOBACCO NON-USER: CPT | Performed by: NURSE PRACTITIONER

## 2021-11-17 PROCEDURE — 3725F SCREEN DEPRESSION PERFORMED: CPT | Performed by: NURSE PRACTITIONER

## 2021-11-17 PROCEDURE — 99385 PREV VISIT NEW AGE 18-39: CPT | Performed by: NURSE PRACTITIONER

## 2021-11-17 PROCEDURE — 3008F BODY MASS INDEX DOCD: CPT | Performed by: NURSE PRACTITIONER

## 2021-11-17 RX ORDER — BETAMETHASONE DIPROPIONATE 0.5 MG/G
CREAM TOPICAL 2 TIMES DAILY
Qty: 30 G | Refills: 0 | Status: SHIPPED | OUTPATIENT
Start: 2021-11-17

## 2021-12-20 ENCOUNTER — OFFICE VISIT (OUTPATIENT)
Dept: URGENT CARE | Facility: MEDICAL CENTER | Age: 26
End: 2021-12-20
Payer: COMMERCIAL

## 2021-12-20 VITALS
HEIGHT: 65 IN | WEIGHT: 153 LBS | RESPIRATION RATE: 16 BRPM | OXYGEN SATURATION: 100 % | BODY MASS INDEX: 25.49 KG/M2 | HEART RATE: 75 BPM | TEMPERATURE: 100.3 F

## 2021-12-20 DIAGNOSIS — B34.9 VIRAL ILLNESS: Primary | ICD-10-CM

## 2021-12-20 PROCEDURE — G0382 LEV 3 HOSP TYPE B ED VISIT: HCPCS | Performed by: PHYSICIAN ASSISTANT

## 2021-12-20 PROCEDURE — 87636 SARSCOV2 & INF A&B AMP PRB: CPT | Performed by: PHYSICIAN ASSISTANT

## 2021-12-21 LAB
FLUAV RNA RESP QL NAA+PROBE: NEGATIVE
FLUBV RNA RESP QL NAA+PROBE: NEGATIVE
SARS-COV-2 RNA RESP QL NAA+PROBE: POSITIVE

## 2021-12-22 ENCOUNTER — TELEPHONE (OUTPATIENT)
Dept: URGENT CARE | Age: 26
End: 2021-12-22

## 2021-12-23 ENCOUNTER — TELEMEDICINE (OUTPATIENT)
Dept: FAMILY MEDICINE CLINIC | Facility: CLINIC | Age: 26
End: 2021-12-23
Payer: COMMERCIAL

## 2021-12-23 DIAGNOSIS — L24.4 IRRITANT CONTACT DERMATITIS DUE TO DRUG IN CONTACT WITH SKIN: ICD-10-CM

## 2021-12-23 DIAGNOSIS — U07.1 COVID-19: Primary | ICD-10-CM

## 2021-12-23 PROCEDURE — 99214 OFFICE O/P EST MOD 30 MIN: CPT | Performed by: NURSE PRACTITIONER

## 2021-12-23 RX ORDER — CLOBETASOL PROPIONATE 0.5 MG/G
OINTMENT TOPICAL 2 TIMES DAILY
Qty: 45 G | Refills: 0 | Status: SHIPPED | OUTPATIENT
Start: 2021-12-23

## 2022-03-03 ENCOUNTER — OFFICE VISIT (OUTPATIENT)
Dept: FAMILY MEDICINE CLINIC | Facility: CLINIC | Age: 27
End: 2022-03-03
Payer: COMMERCIAL

## 2022-03-03 VITALS
BODY MASS INDEX: 27.16 KG/M2 | HEART RATE: 70 BPM | DIASTOLIC BLOOD PRESSURE: 62 MMHG | SYSTOLIC BLOOD PRESSURE: 106 MMHG | TEMPERATURE: 98 F | WEIGHT: 163 LBS | HEIGHT: 65 IN

## 2022-03-03 DIAGNOSIS — L73.9 FOLLICULITIS: Primary | ICD-10-CM

## 2022-03-03 PROCEDURE — 99213 OFFICE O/P EST LOW 20 MIN: CPT | Performed by: PHYSICIAN ASSISTANT

## 2022-03-03 RX ORDER — KETOCONAZOLE 20 MG/G
CREAM TOPICAL DAILY
Qty: 30 G | Refills: 1 | Status: SHIPPED | OUTPATIENT
Start: 2022-03-03

## 2022-03-03 RX ORDER — SULFAMETHOXAZOLE AND TRIMETHOPRIM 800; 160 MG/1; MG/1
1 TABLET ORAL EVERY 12 HOURS SCHEDULED
Qty: 20 TABLET | Refills: 0 | Status: SHIPPED | OUTPATIENT
Start: 2022-03-03 | End: 2022-03-13

## 2022-03-03 NOTE — PATIENT INSTRUCTIONS
Assessment/plan:  1  Folliculitis-patient seems to have follicular rash at the site tattoo from 5 months ago  It seems that follicles are only affected where there was inked  It is unlikely this was a reaction to lidocaine gel used prior to the test 2  Question staph infection versus fungal etiology  He will be started on Bactrim DS, twice daily for 10 days as well as Nizoral cream applied 2-3 times daily for 10-14 days  If he is not better after 2 weeks he should contact our office and I will refer him to Dermatology for further diagnostics  Patient verbalizes understanding and agreement with plan

## 2022-03-03 NOTE — PROGRESS NOTES
Assessment and Plan:  Patient Instructions     Assessment/plan:  1  Folliculitis-patient seems to have follicular rash at the site tattoo from 5 months ago  It seems that follicles are only affected where there was inked  It is unlikely this was a reaction to lidocaine gel used prior to the test 2  Question staph infection versus fungal etiology  He will be started on Bactrim DS, twice daily for 10 days as well as Nizoral cream applied 2-3 times daily for 10-14 days  If he is not better after 2 weeks he should contact our office and I will refer him to Dermatology for further diagnostics  Patient verbalizes understanding and agreement with plan  Problem List Items Addressed This Visit     None      Visit Diagnoses     Folliculitis    -  Primary    Relevant Medications    sulfamethoxazole-trimethoprim (BACTRIM DS) 800-160 mg per tablet    ketoconazole (NIZORAL) 2 % cream                 Diagnoses and all orders for this visit:    Folliculitis  -     sulfamethoxazole-trimethoprim (BACTRIM DS) 800-160 mg per tablet; Take 1 tablet by mouth every 12 (twelve) hours for 10 days  -     ketoconazole (NIZORAL) 2 % cream; Apply topically daily              Subjective:      Patient ID: Mary Maxwell is a 32 y o  male  CC:    Chief Complaint   Patient presents with    Rash     rash on stomach since october, states it was getting better but states its getting worse  Pt states the only new thing he can remeber back in october was numbing cream when getting a tattoo  HPI:     HPI:  This is a 24-year-old gentleman that presents to the office with concerns over a rash that has been persistent around the new tattoo that he received in October of last year  Prior to getting the test to the placed some lidocaine gel on his abdomen and thought he may have been having a persistent allergic reaction to it    Interestingly the small papules are only present in hair follicle areas and areas that have been inked and not areas that are not affected by ink  He did try to strong steroid creams without resolution of symptoms  He states initially they helped to  Reduce the redness but not get rid of it  The following portions of the patient's history were reviewed and updated as appropriate: allergies, current medications, past family history, past medical history, past social history, past surgical history and problem list       Review of Systems   Constitutional: Negative for chills, fatigue and fever  HENT: Negative for congestion, ear pain and sinus pressure  Eyes: Negative for visual disturbance  Respiratory: Negative for cough, chest tightness and shortness of breath  Cardiovascular: Negative for chest pain and palpitations  Gastrointestinal: Negative for diarrhea, nausea and vomiting  Endocrine: Negative for polyuria  Genitourinary: Negative for dysuria and frequency  Musculoskeletal: Negative for arthralgias and myalgias  Skin: Positive for rash  Negative for pallor  Neurological: Negative for dizziness, weakness, light-headedness, numbness and headaches  Psychiatric/Behavioral: Negative for agitation, behavioral problems and sleep disturbance  All other systems reviewed and are negative  Data to review:       Objective:    Vitals:    03/03/22 1432   BP: 106/62   BP Location: Left arm   Patient Position: Sitting   Cuff Size: Adult   Pulse: 70   Temp: 98 °F (36 7 °C)   TempSrc: Probe   Weight: 73 9 kg (163 lb)   Height: 5' 5" (1 651 m)        Physical Exam  Constitutional:       General: He is not in acute distress  Appearance: He is well-developed  HENT:      Head: Normocephalic and atraumatic  Right Ear: Tympanic membrane normal       Left Ear: Tympanic membrane normal    Eyes:      Conjunctiva/sclera: Conjunctivae normal    Cardiovascular:      Rate and Rhythm: Normal rate and regular rhythm     Pulmonary:      Effort: Pulmonary effort is normal    Abdominal:      General: Abdomen is flat  Bowel sounds are normal  There is no distension  Palpations: Abdomen is soft  There is no mass  Musculoskeletal:         General: Normal range of motion  Cervical back: Normal range of motion  Skin:     General: Skin is warm  Findings: No rash  Neurological:      Mental Status: He is alert and oriented to person, place, and time  Psychiatric:         Mood and Affect: Mood normal            BMI Counseling: Body mass index is 27 12 kg/m²  The BMI is above normal  Nutrition recommendations include decreasing portion sizes  Exercise recommendations include exercising 3-5 times per week  Rationale for BMI follow-up plan is due to patient being overweight or obese

## 2022-04-28 ENCOUNTER — PATIENT MESSAGE (OUTPATIENT)
Dept: FAMILY MEDICINE CLINIC | Facility: CLINIC | Age: 27
End: 2022-04-28

## 2022-04-28 DIAGNOSIS — L24.4 IRRITANT CONTACT DERMATITIS DUE TO DRUG IN CONTACT WITH SKIN: Primary | ICD-10-CM

## 2022-04-28 DIAGNOSIS — L73.9 FOLLICULITIS: ICD-10-CM

## 2022-04-29 ENCOUNTER — TELEPHONE (OUTPATIENT)
Dept: FAMILY MEDICINE CLINIC | Facility: CLINIC | Age: 27
End: 2022-04-29

## 2022-04-29 DIAGNOSIS — L73.9 FOLLICULITIS: Primary | ICD-10-CM

## 2022-04-29 NOTE — TELEPHONE ENCOUNTER
PATIENT CALLED STATED HE HAD SENT A MESSAGE THRU MY CHART AND NO ONE HAS GOTTEN BACK TO HIM, I DID MAKE PT AWARE THE MESSAGE WAS ROUTED TO PROVIDER, PATIENT NEEDS A REFERRAL FOR DERMATOLOGY  PLEASE CALL PATIENT abscess  · Boil returns after getting better  © 3124-8589 [employer] [address] [place] [address], [place] [address]. All rights reserved. This information is not intended as a substitute for professional medical care. Always follow your healthcare professional's instructions.

## 2022-05-03 NOTE — TELEPHONE ENCOUNTER
From: Jonna Sotelo  To: AUBREY Bajwa  Sent: 4/28/2022 5:43 PM EDT  Subject: Need Dermatologist referral    Hi Isabel Smoke so i still got the rash from the tattoo from back in October i was seen by someone back in march we tried treating it with something else but still didnt work i was wondering if you can please get me a dermatologist referral so i can get the right treatment thanks

## 2022-06-02 ENCOUNTER — CONSULT (OUTPATIENT)
Dept: DERMATOLOGY | Facility: CLINIC | Age: 27
End: 2022-06-02
Payer: COMMERCIAL

## 2022-06-02 VITALS — TEMPERATURE: 97.9 F | HEIGHT: 65 IN | WEIGHT: 165 LBS | BODY MASS INDEX: 27.49 KG/M2

## 2022-06-02 DIAGNOSIS — L73.9 FOLLICULITIS: ICD-10-CM

## 2022-06-02 PROCEDURE — 99244 OFF/OP CNSLTJ NEW/EST MOD 40: CPT | Performed by: DERMATOLOGY

## 2022-06-02 NOTE — PATIENT INSTRUCTIONS
Assessment and Plan:  Based on a thorough discussion of this condition and the management approach to it (including a comprehensive discussion of the known risks, side effects and potential benefits of treatment), the patient (family) agrees to implement the following specific plan:  *Startf Ciclopirox Crean twice a day for 4 weeks straight  to affected areas as needed     What is folliculitis? Folliculitis is the name given to a group of skin conditions in which there are inflamed hair follicles  The result is a tender red spot, often with a surface pustule  Folliculitis may be superficial or deep  It can affect anywhere there are hairs, including chest, back, buttocks, arms and legs  Acne and its variants are also types of folliculitis  What causes folliculitis? Folliculitis can be due to infection, occlusion (blockage), irritation and various skin diseases  Folliculitis due to infection  To determine if folliculitis is due to an infection, swabs should be taken from the pustules for cytology and culture in the laboratory  Bacteria  Bacterial folliculitis is commonly due to Staphylococcus aureus  If the infection involves the deep part of the follicle, it results in a painful boil  Recommended treatment includes careful hygiene, antiseptic cleanser or cream, antibiotic ointment, and/or oral antibiotics  Spa pool folliculitis is due to infection with Pseudomonas aeruginosa, which thrives in inadequately chlorinated warm water  Gram negative folliculitis is a pustular facial eruption also due to infection with Pseudomonas aeruginosa or other similar organisms  When it appears, it usually follows tetracycline treatment of acne, but is quite rare  Yeasts  The most common yeast to cause a folliculitis is Pityrosporum ovale, also known as Malassezia  Malassezia folliculitis (Pityrosporum folliculitis) is an itchy acne-like condition usually affecting the upper trunk of a young adult   Treatment includes avoiding moisturisers, stopping any antibiotics and topical antifungal or oral antifungal medication for several weeks  Candida albicans can also provoke a folliculitis in skin folds (intertrigo) or in the beard area  It is treated with topical or oral antifungal agents  Fungi  Ringworm of the scalp (tinea capitis) usually results in scaling and hair loss, but sometimes results in folliculitis  In [place], cat ringworm (Microsporum canis) is the commonest organism causing scalp fungal infection  Other fungi such as Trichophyton tonsurans are increasingly reported  Treatment is with oral antifungal agents for several months  Viral infections  Folliculitis may caused by herpes simplex virus  This tends to be tender, and resolves without treatment in around 10 days  Severe recurrent attacks may be treated with acyclovir and other antiviral agents  Herpes zoster (the cause of shingles) may also present as folliculitis with painful pustules and crusted spots within a dermatome (an area of skin supplied by a single nerve)  It is treated with hihg-dose acyclovir  Molluscum contagiosum, common in young children, may also cause follicular umbilicated papules, usually clustered in and around a body fold  Molluscum may provoke dermatitis  Parasitic infection  Folliculitis on the face or scalp of older or immunosuppressed adults may be due to colonisation by hair follicle mites (demodex)  This is known as demodicosis  The human infestation, scabies, often provokes folliculitis, as well as non-follicular papules, vesicles and pustules  Folliculitis due to irritation from regrowing hairs  Folliculitis may arise as hairs regrow after shaving, waxing, electrolysis or plucking  Swabs taken from the pustules are sterile ie there is no growth of bacteria or other organisms  In the beard area irritant folliculitis is known as pseudofolliculitis barbae    Irritant folliculitis is also common on the lower legs of women (shaving rash)  It is frequently very itchy  Treatment is by stopping hair removal, and not beginning again for about three months after the folliculitis has settled  To prevent reoccurring irritant folliculitis, use a gentle hair removal method, such as a lady's electric razor  Avoid soap and apply plenty of shaving gel, if using a blade shaver  Folliculitis due to contact reactions  Occlusion  Paraffin-based ointments, moisturisers, and adhesive plasters may all result in a sterile folliculitis  If a moisturiser is needed, choose an oil-free product, as it is less likely to cause occlusion  Chemicals  Coal tar, cutting oils and other chemicals may cause an irritant folliculitis  Avoid contact with the causative product  Topical steroids  Overuse of topical steroids may produce a folliculitis  Perioral dermatitis is a facial folliculitis provoked by moisturisers and topical steroids  Perioral dermatitis is treated with tetracycline antibiotics for six weeks or so  Folliculitis due to immunosuppression  Eosinophilic folliculitis is a specific type of folliculitis that may arise in some immune suppressed individuals such as those infected by human immunodeficiency virus (HIV) or those who have cancer  Folliculitis due to drugs  Folliculitis may be due to drugs, particularly corticosteroids (steroid acne), androgens (male hormones), ACTH, lithium, isoniazid (INH), phenytoin and B-complex vitamins  Protein kinase inhibitors (epidermal growth factor receptor inhibitors) and targeted therapy for metastatic melanoma (vemurafenib, dabrafenib) nearly always result in folliculitis  Folliculitis due to inflammatory skin diseases  Certain uncommon inflammatory skin diseases may cause permanent hair loss and scarring because of deep seated sterile folliculitis   These include:  Lichen planus  Discoid lupus erythematosus  Folliculitis decalvans  Folliculitis keloidalis     Treatment depends on the underlying condition and its severity  A skin biopsy is often necessary to establish the diagnosis  Acne variants   Acne and acne-like or acneform disorders are also forms of folliculitis  These include:  Acne vulgaris  Nodulocystic acne  Rosacea  Scalp folliculitis  Chloracne    The follicular occlusion syndrome refers to:  Hidradenitis suppurativa (acne inversa)  Acne conglobata (a severe form of nodulocystic acne)  Dissecting cellulitis (perifolliculitis capitis abscedens et suffodiens)  Pilonidal sinus  Treatment of the acne variants may include topical therapy as well as long courses of tetracycline antibiotics, isotretinoin (vitamin-A derivative) and in women, antiandrogenic therapy  Buttock folliculitis  Folliculitis affecting the buttocks is quite common and is often nonspecific, ie no specific cause is found  Buttock folliculitis is equally common in males and females  Acute buttock folliculitis is usually bacterial in origin (like boils), resulting in red painful papules and pustules  It clears with antibiotics  Chronic buttock folliculitis does not often cause significant symptoms but it can be very persistent  Although antiseptics, topical acne treatments, peeling agents such as alphahydroxy acids, long courses of oral antibiotics and isotretinoin can help buttock folliculitis, they are not always effective  Hair removal might be worth trying if the affected area is hairy   As regrowth of hair can make it worse, permanent hair reduction by laser or intense pulsed light (IPL) is best

## 2022-06-02 NOTE — PROGRESS NOTES
[facility] Note     Patient Name: Rexanne Gottron  Encounter Date: 06/02/2022      Have you been cared for by a [facility] Dermatologist in the last 3 years and, if so, which one? No    · Have you traveled outside of the [address] [place] [address] in the past 3 months or outside of the [place] campus area in the last 2 weeks? No     May we call your Preferred Phone number to discuss your specific medical information? Yes     May we leave a detailed message that includes your specific medical information? Yes      Today's Chief Concerns:   Concern #1:  Abdominal rash     Past Medical History:  Have you personally ever had or currently have any of the following? · Skin cancer (such as Melanoma, Basal Cell Carcinoma, Squamous Cell Carcinoma? (If Yes, please provide more detail)- No  · Eczema: No  · Psoriasis: No  · HIV/AIDS: No  · Hepatitis B or C: No  · Tuberculosis: No  · Systemic Immunosuppression such as Diabetes, Biologic or Immunotherapy, Chemotherapy, Organ Transplantation, Bone Marrow Transplantation (If YES, please provide more detail): No  · Radiation Treatment (If YES, please provide more detail): No  · Any other major medical conditions/concerns? (If Yes, which types)- No    Social History:     What is/was your primary occupation?       What are your hobbies/past-times? Work [employer]     Family History:  Have any of your "first degree relatives" (parent, brother, sister, or child) had any of the following       · Skin cancer such as Melanoma or Merkel Cell Carcinoma or Pancreatic Cancer? No  · Eczema, Asthma, Hay Fever or Seasonal Allergies: No  · Psoriasis or Psoriatic Arthritis: No  · Do any other medical conditions seem to run in your family? If Yes, what condition and which relatives?   No    Current Medications:       Current Outpatient Medications:     betamethasone, augmented, (DIPROLENE-AF) 0 05 % cream, Apply topically 2 (two) times a day (Patient not taking: Reported on 6/2/2022), Disp: 30 g, Rfl: 0    clobetasol (TEMOVATE) 0 05 % ointment, Apply topically 2 (two) times a day (Patient not taking: No sig reported), Disp: 45 g, Rfl: 0    ketoconazole (NIZORAL) 2 % cream, Apply topically daily (Patient not taking: Reported on 6/2/2022), Disp: 30 g, Rfl: 1      Review of Systems:  Have you recently had or currently have any of the following? If YES, what are you doing for the problem? · Fever, chills or unintended weight loss: No  · Sudden loss or change in your vision: No  · Nausea, vomiting or blood in your stool: No  · Painful or swollen joints: No  · Wheezing or cough: No  · Changing mole or non-healing wound: No  · Nosebleeds: No  · Excessive sweating: No  · Easy or prolonged bleeding? No  · Over the last 2 weeks, how often have you been bothered by the following problems? · Taking little interest or pleasure in doing things: 1 - Not at All  · Feeling down, depressed, or hopeless: 1 - Not at All  · Rapid heartbeat with epinephrine:  No    · FEMALES ONLY:    · Are you pregnant or planning to become pregnant? N/A  · Are you currently or planning to be nursing or breast feeding? N/A    · Any known allergies? · No Known Allergies      Physical Exam:     Was a chaperone (Derm Clinical Assistant) present throughout the entire Physical Exam? Yes     Did the Dermatology Team specifically  the patient on the importance of a Full Skin Exam to be sure that nothing is missed clinically?  Yes}  o Did the patient ultimately request or accept a Full Skin Exam?  NO  o Did the patient specifically refuse to have the areas "under-the-bra" examined by the Dermatologist? No  o Did the patient specifically refuse to have the areas "under-the-underwear" examined by the Dermatologist? No    CONSTITUTIONAL:   Vitals:    06/02/22 0827   Temp: 97 9 °F (36 6 °C)   TempSrc: Temporal   Weight: 74 8 kg (165 lb)   Height: 5' 5" (1 651 m)       PSYCH: Normal mood and affect  EYES: Normal conjunctiva  ENT: Normal lips and oral mucosa  CARDIOVASCULAR: No edema  RESPIRATORY: Normal respirations  HEME/LYMPH/IMMUNO:  No regional lymphadenopathy except as noted below in "ASSESSMENT AND PLAN BY DIAGNOSIS"    SKIN:  FULL ORGAN SYSTEM EXAM   Hair, Scalp, Ears, Face Normal except as noted below in Assessment   Neck, Cervical Chain Nodes Normal except as noted below in Assessment   Right Arm/Hand/Fingers Normal except as noted below in Assessment   Left Arm/Hand/Fingers Normal except as noted below in Assessment   Chest/Breasts/Axillae Viewed areas Normal except as noted below in Assessment   Abdomen, Umbilicus Normal except as noted below in Assessment   Back/Spine    Groin/Genitalia/Buttocks    Right Leg, Foot, Toes    Left Leg, Foot, Toes         Assessment and Plan by Diagnosis:    History of Present Condition:     Duration:  How long has this been an issue for you?    o  October    Location Affected:  Where on the body is this affecting you?    o  Abdomen   Quality:  Is there any bleeding, pain, itch, burning/irritation, or redness associated with the skin lesion?    o  Itching sometimes    Severity:  Describe any bleeding, pain, itch, burning/irritation, or redness on a scale of 1 to 10 (with 10 being the worst)  o  1   Timing:  Does this condition seem to be there pretty constantly or do you notice it more at specific times throughout the day?    o  Constantly    Context:  Have you ever noticed that this condition seems to be associated with specific activities you do?    o  Denies   Modifying Factors:    o Anything that seems to make the condition worse? -  Shaving   o What have you tried to do to make the condition better?     -  Denies    Associated Signs and Symptoms:  Does this skin lesion seem to be associated with any of the following:  o  SL AMB DERM SIGNS AND SYMPTOMS: Itching and Scratching     FOLLICULITIS (POSSIBLE)/HISTORY OF DERMATITIS    Physical Exam:   Anatomic Location Affected:  Abdomen    Morphological Description: See patient's cell-phone photo   Pertinent Positives:   Pertinent Negatives: n/a            Additional History of Present Condition:  Located on the abdomen  Present since October  Started with new tattoo  Reports itching sometimes  Was treated with ketoconazole Cream, clobetasol ointment,  Betamethasone cream, help but has come back     Assessment and Plan:  Based on a thorough discussion of this condition and the management approach to it (including a comprehensive discussion of the known risks, side effects and potential benefits of treatment), the patient (family) agrees to implement the following specific plan:   Start ciclopirox cream twice a day for 4 weeks to affected areas as needed     What is folliculitis? Folliculitis is the name given to a group of skin conditions in which there are inflamed hair follicles  The result is a tender red spot, often with a surface pustule  Folliculitis may be superficial or deep  It can affect anywhere there are hairs, including chest, back, buttocks, arms and legs  Acne and its variants are also types of folliculitis  What causes folliculitis? Folliculitis can be due to infection, occlusion (blockage), irritation and various skin diseases  Folliculitis due to infection  To determine if folliculitis is due to an infection, swabs should be taken from the pustules for cytology and culture in the laboratory  Bacteria  Bacterial folliculitis is commonly due to Staphylococcus aureus  If the infection involves the deep part of the follicle, it results in a painful boil  Recommended treatment includes careful hygiene, antiseptic cleanser or cream, antibiotic ointment, and/or oral antibiotics  Spa pool folliculitis is due to infection with Pseudomonas aeruginosa, which thrives in inadequately chlorinated warm water   Gram negative folliculitis is a pustular facial eruption also due to infection with Pseudomonas aeruginosa or other similar organisms  When it appears, it usually follows tetracycline treatment of acne, but is quite rare  Yeasts  The most common yeast to cause a folliculitis is Pityrosporum ovale, also known as Malassezia  Malassezia folliculitis (Pityrosporum folliculitis) is an itchy acne-like condition usually affecting the upper trunk of a young adult  Treatment includes avoiding moisturisers, stopping any antibiotics and topical antifungal or oral antifungal medication for several weeks  Candida albicans can also provoke a folliculitis in skin folds (intertrigo) or in the beard area  It is treated with topical or oral antifungal agents  Fungi  Ringworm of the scalp (tinea capitis) usually results in scaling and hair loss, but sometimes results in folliculitis  In [place], cat ringworm (Microsporum canis) is the commonest organism causing scalp fungal infection  Other fungi such as Trichophyton tonsurans are increasingly reported  Treatment is with oral antifungal agents for several months  Viral infections  Folliculitis may caused by herpes simplex virus  This tends to be tender, and resolves without treatment in around 10 days  Severe recurrent attacks may be treated with acyclovir and other antiviral agents  Herpes zoster (the cause of shingles) may also present as folliculitis with painful pustules and crusted spots within a dermatome (an area of skin supplied by a single nerve)  It is treated with hihg-dose acyclovir  Molluscum contagiosum, common in young children, may also cause follicular umbilicated papules, usually clustered in and around a body fold  Molluscum may provoke dermatitis  Parasitic infection  Folliculitis on the face or scalp of older or immunosuppressed adults may be due to colonisation by hair follicle mites (demodex)  This is known as demodicosis    The human infestation, scabies, often provokes folliculitis, as well as non-follicular papules, vesicles and pustules  Folliculitis due to irritation from regrowing hairs  Folliculitis may arise as hairs regrow after shaving, waxing, electrolysis or plucking  Swabs taken from the pustules are sterile ie there is no growth of bacteria or other organisms  In the beard area irritant folliculitis is known as pseudofolliculitis barbae  Irritant folliculitis is also common on the lower legs of women (shaving rash)  It is frequently very itchy  Treatment is by stopping hair removal, and not beginning again for about three months after the folliculitis has settled  To prevent reoccurring irritant folliculitis, use a gentle hair removal method, such as a lady's electric razor  Avoid soap and apply plenty of shaving gel, if using a blade shaver  Folliculitis due to contact reactions  Occlusion  Paraffin-based ointments, moisturisers, and adhesive plasters may all result in a sterile folliculitis  If a moisturiser is needed, choose an oil-free product, as it is less likely to cause occlusion  Chemicals  Coal tar, cutting oils and other chemicals may cause an irritant folliculitis  Avoid contact with the causative product  Topical steroids  Overuse of topical steroids may produce a folliculitis  Perioral dermatitis is a facial folliculitis provoked by moisturisers and topical steroids  Perioral dermatitis is treated with tetracycline antibiotics for six weeks or so  Folliculitis due to immunosuppression  Eosinophilic folliculitis is a specific type of folliculitis that may arise in some immune suppressed individuals such as those infected by human immunodeficiency virus (HIV) or those who have cancer  Folliculitis due to drugs  Folliculitis may be due to drugs, particularly corticosteroids (steroid acne), androgens (male hormones), ACTH, lithium, isoniazid (INH), phenytoin and B-complex vitamins   Protein kinase inhibitors (epidermal growth factor receptor inhibitors) and targeted therapy for metastatic melanoma (vemurafenib, dabrafenib) nearly always result in folliculitis  Folliculitis due to inflammatory skin diseases  Certain uncommon inflammatory skin diseases may cause permanent hair loss and scarring because of deep seated sterile folliculitis  These include:   Lichen planus   Discoid lupus erythematosus   Folliculitis decalvans   Folliculitis keloidalis     Treatment depends on the underlying condition and its severity  A skin biopsy is often necessary to establish the diagnosis  Acne variants   Acne and acne-like or acneform disorders are also forms of folliculitis  These include:   Acne vulgaris   Nodulocystic acne   Rosacea   Scalp folliculitis   Chloracne    The follicular occlusion syndrome refers to:   Hidradenitis suppurativa (acne inversa)   Acne conglobata (a severe form of nodulocystic acne)   Dissecting cellulitis (perifolliculitis capitis abscedens et suffodiens)   Pilonidal sinus  Treatment of the acne variants may include topical therapy as well as long courses of tetracycline antibiotics, isotretinoin (vitamin-A derivative) and in women, antiandrogenic therapy  Buttock folliculitis  Folliculitis affecting the buttocks is quite common and is often nonspecific, ie no specific cause is found  Buttock folliculitis is equally common in males and females   Acute buttock folliculitis is usually bacterial in origin (like boils), resulting in red painful papules and pustules  It clears with antibiotics   Chronic buttock folliculitis does not often cause significant symptoms but it can be very persistent  Although antiseptics, topical acne treatments, peeling agents such as alphahydroxy acids, long courses of oral antibiotics and isotretinoin can help buttock folliculitis, they are not always effective  Hair removal might be worth trying if the affected area is hairy   As regrowth of hair can make it worse, permanent hair reduction by laser or intense pulsed light (IPL) is best       Scribe Attestation    I,:  Denver Bucker, MA am acting as a scribe while in the presence of the attending physician :       I,:  Ishaan Garland MD personally performed the services described in this documentation    as scribed in my presence :

## 2022-11-14 ENCOUNTER — APPOINTMENT (OUTPATIENT)
Dept: LAB | Facility: HOSPITAL | Age: 27
End: 2022-11-14

## 2022-11-14 DIAGNOSIS — Z11.59 ENCOUNTER FOR HEPATITIS C VIRUS SCREENING TEST FOR HIGH RISK PATIENT: ICD-10-CM

## 2022-11-14 DIAGNOSIS — Z00.00 ANNUAL PHYSICAL EXAM: ICD-10-CM

## 2022-11-14 DIAGNOSIS — E55.9 VITAMIN D DEFICIENCY: ICD-10-CM

## 2022-11-14 DIAGNOSIS — Z91.89 ENCOUNTER FOR HEPATITIS C VIRUS SCREENING TEST FOR HIGH RISK PATIENT: ICD-10-CM

## 2022-11-14 LAB
25(OH)D3 SERPL-MCNC: 35.8 NG/ML (ref 30–100)
ALBUMIN SERPL BCP-MCNC: 3.8 G/DL (ref 3.5–5)
ALP SERPL-CCNC: 80 U/L (ref 46–116)
ALT SERPL W P-5'-P-CCNC: 35 U/L (ref 12–78)
ANION GAP SERPL CALCULATED.3IONS-SCNC: 4 MMOL/L (ref 4–13)
AST SERPL W P-5'-P-CCNC: 27 U/L (ref 5–45)
BILIRUB SERPL-MCNC: 0.79 MG/DL (ref 0.2–1)
BUN SERPL-MCNC: 23 MG/DL (ref 5–25)
CALCIUM SERPL-MCNC: 9.1 MG/DL (ref 8.3–10.1)
CHLORIDE SERPL-SCNC: 111 MMOL/L (ref 96–108)
CHOLEST SERPL-MCNC: 145 MG/DL
CO2 SERPL-SCNC: 26 MMOL/L (ref 21–32)
CREAT SERPL-MCNC: 0.95 MG/DL (ref 0.6–1.3)
ERYTHROCYTE [DISTWIDTH] IN BLOOD BY AUTOMATED COUNT: 11.2 % (ref 11.6–15.1)
GFR SERPL CREATININE-BSD FRML MDRD: 107 ML/MIN/1.73SQ M
GLUCOSE P FAST SERPL-MCNC: 92 MG/DL (ref 65–99)
HCT VFR BLD AUTO: 43.3 % (ref 36.5–49.3)
HCV AB SER QL: NORMAL
HDLC SERPL-MCNC: 41 MG/DL
HGB BLD-MCNC: 14.8 G/DL (ref 12–17)
LDLC SERPL CALC-MCNC: 92 MG/DL (ref 0–100)
MCH RBC QN AUTO: 32.3 PG (ref 26.8–34.3)
MCHC RBC AUTO-ENTMCNC: 34.2 G/DL (ref 31.4–37.4)
MCV RBC AUTO: 95 FL (ref 82–98)
NONHDLC SERPL-MCNC: 104 MG/DL
PLATELET # BLD AUTO: 292 THOUSANDS/UL (ref 149–390)
PMV BLD AUTO: 9 FL (ref 8.9–12.7)
POTASSIUM SERPL-SCNC: 4.1 MMOL/L (ref 3.5–5.3)
PROT SERPL-MCNC: 7.8 G/DL (ref 6.4–8.4)
RBC # BLD AUTO: 4.58 MILLION/UL (ref 3.88–5.62)
SODIUM SERPL-SCNC: 141 MMOL/L (ref 135–147)
TRIGL SERPL-MCNC: 59 MG/DL
TSH SERPL DL<=0.05 MIU/L-ACNC: 1.39 UIU/ML (ref 0.45–4.5)
WBC # BLD AUTO: 5.24 THOUSAND/UL (ref 4.31–10.16)

## 2023-02-21 ENCOUNTER — OFFICE VISIT (OUTPATIENT)
Dept: FAMILY MEDICINE CLINIC | Facility: CLINIC | Age: 28
End: 2023-02-21

## 2023-02-21 VITALS
BODY MASS INDEX: 28.32 KG/M2 | DIASTOLIC BLOOD PRESSURE: 60 MMHG | WEIGHT: 170 LBS | HEIGHT: 65 IN | OXYGEN SATURATION: 98 % | SYSTOLIC BLOOD PRESSURE: 92 MMHG | HEART RATE: 71 BPM

## 2023-02-21 DIAGNOSIS — Z00.00 ANNUAL PHYSICAL EXAM: Primary | ICD-10-CM

## 2023-02-21 DIAGNOSIS — E55.9 VITAMIN D DEFICIENCY: ICD-10-CM

## 2023-02-21 DIAGNOSIS — Z30.2 ENCOUNTER FOR STERILIZATION IN MALE: ICD-10-CM

## 2023-02-21 PROBLEM — E78.5 DYSLIPIDEMIA: Status: RESOLVED | Noted: 2017-04-03 | Resolved: 2023-02-21

## 2023-02-21 NOTE — PROGRESS NOTES
[facility]    NAME: Kevon Rodriguez  AGE: 25 y o  SEX: male  : 1995     DATE: 2023     Assessment and Plan:     Problem List Items Addressed This Visit        Other    Vitamin D deficiency     Recheck vitamin d levels          Relevant Orders    Vitamin D 25 hydroxy    Annual physical exam - Primary     Routine health labs  Patient healthy male  referral to urology provided   Edis Dudley in one year          Relevant Orders    Lipid panel    Comprehensive metabolic panel    TSH, 3rd generation with Free T4 reflex   Other Visit Diagnoses     Encounter for sterilization in male        Relevant Orders    Ambulatory Referral to Urology          Immunizations and preventive care screenings were discussed with patient today  Appropriate education was printed on patient's after visit summary  Discussed risks and benefits of prostate cancer screening  We discussed the controversial history of PSA screening for prostate cancer in the [place] as well as the risk of over detection and over treatment of prostate cancer by way of PSA screening  The patient understands that PSA blood testing is an imperfect way to screen for prostate cancer and that elevated PSA levels in the blood may also be caused by infection, inflammation, prostatic trauma or manipulation, urological procedures, or by benign prostatic enlargement  The role of the digital rectal examination in prostate cancer screening was also discussed and I discussed with him that there is large interobserver variability in the findings of digital rectal examination  Counseling:  Alcohol/drug use: discussed moderation in alcohol intake, the recommendations for healthy alcohol use, and avoidance of illicit drug use  Dental Health: discussed importance of regular tooth brushing, flossing, and dental visits    Injury prevention: discussed safety/seat belts, safety helmets, smoke detectors, carbon dioxide detectors, and smoking near bedding or upholstery  Sexual health: discussed sexually transmitted diseases, partner selection, use of condoms, avoidance of unintended pregnancy, and contraceptive alternatives  Exercise: the importance of regular exercise/physical activity was discussed  Recommend exercise 3-5 times per week for at least 30 minutes  BMI Counseling: Body mass index is 28 29 kg/m²  The BMI is above normal  Nutrition recommendations include decreasing portion sizes, moderation in carbohydrate intake, reducing intake of saturated and trans fat and reducing intake of cholesterol  Exercise recommendations include moderate physical activity 150 minutes/week and strength training exercises  Rationale for BMI follow-up plan is due to patient being overweight or obese  Depression Screening and Follow-up Plan: Patient was screened for depression during today's encounter  They screened negative with a PHQ-2 score of 0  Return in about 1 year (around 2/21/2024) for Annual exam PE  Chief Complaint:     Chief Complaint   Patient presents with   • Physical Exam     Patient is present today for annual physical exam      History of Present Illness:     Adult Annual Physical   Patient here for a comprehensive physical exam  The patient reports no problems  Patient wants to get vasectomy  He has one biological child  Diet and Physical Activity  Diet/Nutrition: well balanced diet and low carb diet  Exercise: 5-7 times a week on average  Depression Screening  PHQ-2/9 Depression Screening    Little interest or pleasure in doing things: 0 - not at all  Feeling down, depressed, or hopeless: 0 - not at all  PHQ-2 Score: 0  PHQ-2 Interpretation: Negative depression screen       General Health  Sleep: sleeps well  Hearing: normal - bilateral   Vision: most recent eye exam <1 year ago and wears glasses  Dental: regular dental visits          Health  Symptoms include: none     Review of Systems:     Review of Systems   Constitutional: Negative for chills, diaphoresis, fatigue and fever  HENT: Negative  Eyes: Negative for visual disturbance  Respiratory: Negative for chest tightness and shortness of breath  Cardiovascular: Negative for chest pain  Gastrointestinal: Negative for abdominal pain, diarrhea, nausea and vomiting  Genitourinary: Negative for difficulty urinating  Musculoskeletal: Negative for joint swelling  Skin: Negative for rash  Neurological: Negative for headaches  Psychiatric/Behavioral: Negative for dysphoric mood and suicidal ideas  The patient is not nervous/anxious         Past Medical History:     Past Medical History:   Diagnosis Date   • Lymphadenopathy    • wears glasses       Past Surgical History:     Past Surgical History:   Procedure Laterality Date   • WISDOM TOOTH EXTRACTION      and 4 additional teeth that were extra      Family History:     Family History   Problem Relation Age of Onset   • Hypertension Mother    • Mental illness Mother         panic attacks   • Anxiety disorder Mother    • Depression Mother    • Asthma Father    • Lung cancer Father    • No Known Problems Sister    • Mental illness Brother         bipolar disorder   • No Known Problems Son    • No Known Problems Daughter    • Diabetes Maternal Grandmother    • Diabetes Maternal Grandfather         possible   • Diabetes Maternal Uncle    • No Known Problems Sister       Social History:     Social History     Socioeconomic History   • Marital status: /Civil Union     Spouse name: None   • Number of children: 2   • Years of education: None   • Highest education level: None   Occupational History   • Occupation: employed     Comment: full-time   Tobacco Use   • Smoking status: Never   • Smokeless tobacco: Never   Vaping Use   • Vaping Use: Former   Substance and Sexual Activity   • Alcohol use: Yes     Comment: ocassional    • Drug use: No   • Sexual activity: Yes     Partners: Female   Other Topics Concern   • None   Social History Narrative    Checked [place]     Social Determinants of Health     Financial Resource Strain: Not on file   Food Insecurity: Not on file   Transportation Needs: Not on file   Physical Activity: Not on file   Stress: Not on file   Social Connections: Not on file   Intimate Partner Violence: Not on file   Housing Stability: Not on file      Current Medications:     Current Outpatient Medications   Medication Sig Dispense Refill   • betamethasone, augmented, (DIPROLENE-AF) 0 05 % cream Apply topically 2 (two) times a day (Patient not taking: Reported on 6/2/2022) 30 g 0   • ciclopirox (LOPROX) 0 77 % cream Apply topically 2 (two) times a day (Patient not taking: Reported on 2/21/2023) 30 g 2   • clobetasol (TEMOVATE) 0 05 % ointment Apply topically 2 (two) times a day (Patient not taking: Reported on 3/3/2022) 45 g 0   • ketoconazole (NIZORAL) 2 % cream Apply topically daily (Patient not taking: Reported on 6/2/2022) 30 g 1     No current facility-administered medications for this visit  Allergies:     No Known Allergies   Physical Exam:     BP 92/60 (BP Location: Right arm, Patient Position: Sitting, Cuff Size: Standard)   Pulse 71   Ht 5' 5" (1 651 m)   Wt 77 1 kg (170 lb)   SpO2 98%   BMI 28 29 kg/m²     Physical Exam  Vitals and nursing note reviewed  Constitutional:       General: He is not in acute distress  Appearance: He is well-developed  He is not ill-appearing, toxic-appearing or diaphoretic  HENT:      Head: Normocephalic and atraumatic  Right Ear: Tympanic membrane and external ear normal       Left Ear: Tympanic membrane and external ear normal       Nose: Nose normal       Mouth/Throat:      Mouth: Mucous membranes are moist       Pharynx: Uvula midline  No oropharyngeal exudate or posterior oropharyngeal erythema  Eyes:      General: No scleral icterus       Extraocular Movements: Extraocular movements intact  Conjunctiva/sclera: Conjunctivae normal       Pupils: Pupils are equal, round, and reactive to light  Neck:      Thyroid: No thyromegaly  Vascular: No carotid bruit or JVD  Cardiovascular:      Rate and Rhythm: Normal rate and regular rhythm  Pulses:           Carotid pulses are 2+ on the right side and 2+ on the left side  Heart sounds: Normal heart sounds, S1 normal and S2 normal    Pulmonary:      Effort: Pulmonary effort is normal  No respiratory distress  Breath sounds: Normal breath sounds  Abdominal:      General: Bowel sounds are normal  There is no distension  Palpations: Abdomen is soft  Tenderness: There is no abdominal tenderness  Musculoskeletal:         General: Normal range of motion  Cervical back: Normal range of motion  Right lower leg: No edema  Left lower leg: No edema  Lymphadenopathy:      Cervical: No cervical adenopathy  Skin:     General: Skin is warm and dry  Capillary Refill: Capillary refill takes less than 2 seconds  Neurological:      Mental Status: He is alert and oriented to person, place, and time  Motor: Motor function is intact  Gait: Gait is intact  Gait normal    Psychiatric:         Attention and Perception: Attention normal          Mood and Affect: Mood normal          Speech: Speech normal          Behavior: Behavior normal  Behavior is cooperative  Thought Content:  Thought content normal           AUBREY Castaneda  [facility]